# Patient Record
Sex: MALE | Race: WHITE
[De-identification: names, ages, dates, MRNs, and addresses within clinical notes are randomized per-mention and may not be internally consistent; named-entity substitution may affect disease eponyms.]

---

## 2018-07-14 ENCOUNTER — HOSPITAL ENCOUNTER (EMERGENCY)
Dept: HOSPITAL 62 - ER | Age: 28
Discharge: HOME | End: 2018-07-14
Payer: OTHER GOVERNMENT

## 2018-07-14 VITALS — SYSTOLIC BLOOD PRESSURE: 127 MMHG | DIASTOLIC BLOOD PRESSURE: 66 MMHG

## 2018-07-14 DIAGNOSIS — M79.645: Primary | ICD-10-CM

## 2018-07-14 DIAGNOSIS — S00.81XA: ICD-10-CM
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
## 2018-07-14 DIAGNOSIS — M79.89: ICD-10-CM

## 2018-07-14 PROCEDURE — 99283 EMERGENCY DEPT VISIT LOW MDM: CPT

## 2018-07-14 NOTE — RADIOLOGY REPORT (SQ)
EXAM DESCRIPTION:  HAND RIGHT 3 VIEWS



COMPLETED DATE/TIME:  7/14/2018 2:01 pm



REASON FOR STUDY:  pain, swollen



COMPARISON:  None.



EXAM PARAMETERS:  NUMBER OF VIEWS: Three views.

TECHNIQUE: AP, lateral and oblique  radiographic images acquired of the right hand.

LIMITATIONS: None.



FINDINGS:  MINERALIZATION: Normal.

BONES: No acute fracture or dislocation.  No worrisome bone lesions.

JOINTS: No effusions.

SOFT TISSUES: No soft tissue swelling.  No foreign body.

OTHER: No other significant finding.



IMPRESSION:  NO RADIOGRAPHIC EVIDENCE OF ACUTE INJURY.



TECHNICAL DOCUMENTATION:  JOB ID:  [id number]

[id number]

 2011 [employer]- All Rights Reserved



Reading location - IP/workstation name: [email or web address]

## 2020-03-13 NOTE — ER DOCUMENT REPORT
HPI





- HPI


Patient complains to provider of: left middle finger pain


Onset: This morning


Onset/Duration: Sudden


Quality of pain: Achy


Severity: Moderate


Pain Level: 3


Context: 





Presents to emergency department with complaints of left middle finger pain.  

Patient reports he was out having a good time last night wrestling with his 

friends.  Believes he injured his finger at that time.  Patient has abrasions 

to the face denies any other injuries.  Denies any other symptoms. He is right 

hand dominant.


Associated Symptoms: None


Exacerbated by: Movement


Relieved by: Denies


Similar symptoms previously: No


Recently seen / treated by doctor: No





- MUSCULOSKELETAL


Musculoskeletal: REPORTS: Extremity pain





Past Medical History





- General


Information source: Patient





- Social History


Smoking Status: Never Smoker


Cigarette use (# per day): No


Frequency of alcohol use: Social


Drug Abuse: None


Occupation: [employer]


Family History: None


Patient has suicidal ideation: No


Patient has homicidal ideation: No





- Medical History


Medical History: Negative


Renal/ Medical History: Denies: Hx Peritoneal Dialysis


Surgical Hx: Negative





Vertical Provider Document





- CONSTITUTIONAL


Agree With Documented VS: Yes


Exam Limitations: No Limitations


General Appearance: WD/WN, No Apparent Distress





- HEENT


HEENT: Atraumatic, Normocephalic.  negative: Conjuctival Injection





- RESPIRATORY


Respiratory: Breath Sounds Normal, No Respiratory Distress





- CARDIOVASCULAR


Cardiovascular: Regular Rate





- MUSCULOSKELETAL/EXTREMETIES


Musculoskeletal/Extremeties: MAEW, FROM, Tender - right middle finger proximal 

ttp, swollen, good cap refill, FROM





- NEURO


Level of Consciousness: Awake, Alert, Appropriate


Motor/Sensory: No Motor Deficit





- DERM


Integumentary: Warm, Dry


Adult Front & Back Diagram: 


  __________________________














  __________________________





 1 - Abrasion





 2 - right middle finger, proximal ttp, swollen








Course





- Re-evaluation


Re-evalutation: 





07/14/18 14:32


Instructed on negative x-ray.  We will place a finger splint for comfort.  

Instructed follow-up with primary care for continued pain.





- Vital Signs


Vital signs: 


 











Temp Pulse Resp BP Pulse Ox


 


 97.7 F   75   16   127/66 H  96 


 


 07/14/18 12:59  07/14/18 12:59  07/14/18 12:59  07/14/18 12:59  07/14/18 12:59














- Diagnostic Test


Radiology reviewed: Image reviewed, Reports reviewed - EXAM DESCRIPTION: HAND 

RIGHT 3 VIEWS   COMPLETED DATE/TIME: 7/14/2018 2:01 pm   REASON FOR STUDY: pain

, swollen   COMPARISON: None.   EXAM PARAMETERS: NUMBER OF VIEWS: Three views.  

TECHNIQUE: AP, lateral and oblique radiographic images acquired of the right 

hand.  LIMITATIONS: None.   FINDINGS: MINERALIZATION: Normal.  BONES: No acute 

fracture or dislocation. No worrisome bone lesions.  JOINTS: No effusions.  

SOFT TISSUES: No soft tissue swelling. No foreign body.  OTHER: No other 

significant finding.   IMPRESSION: NO RADIOGRAPHIC EVIDENCE OF ACUTE INJURY.





Procedures





- Immobilization


  ** Right 3rd digit


Pre-Proc Neuro Vasc Exam: Normal


Immobilizer type: Finger splint (Static)


Performed by: PCT


Post-Proc Neuro Vasc Exam: Unchanged from pre-exam


Alignment checked and good: Yes





Discharge





- Discharge


Clinical Impression: 


 Pain of right middle finger





Facial abrasion


Qualifiers:


 Encounter type: initial encounter Qualified Code(s): S00.81XA - Abrasion of 

other part of head, initial encounter





Condition: Stable


Disposition: HOME, SELF-CARE


Instructions:  Abrasions of the Face ([facility]), Use of Over-The-Counter Ibuprofen (

[facility]), Ice & Elevation ([facility]), Temporary Splint ([facility])


Additional Instructions: 


*You have been evaluated for a finger injury, facial abrasions


*Maintain the splint 


*Rest/Ice/Elevate


*Follow up with orthopedics for continued pain-call for an appointment


*Take ibuprofen as indicated for pain


*Monitor facial abrasions for signs of infection such as increased pain, 

swelling/redness, keep clean


*Return to ED for worsening condition, changes, needs











Monitor your blood pressure. Your blood pressure was elevated today.  This may 

be because you were anxious, in pain or because you need medication.  It is 

important to follow up with your primary care provider for full evaluation.


Forms:  Elevated Blood Pressure
15-Mar-2020